# Patient Record
Sex: MALE | Race: WHITE | NOT HISPANIC OR LATINO | Employment: UNEMPLOYED | URBAN - METROPOLITAN AREA
[De-identification: names, ages, dates, MRNs, and addresses within clinical notes are randomized per-mention and may not be internally consistent; named-entity substitution may affect disease eponyms.]

---

## 2020-09-14 ENCOUNTER — OFFICE VISIT (OUTPATIENT)
Dept: PAIN MEDICINE | Facility: CLINIC | Age: 55
End: 2020-09-14
Attending: ANESTHESIOLOGY
Payer: OTHER GOVERNMENT

## 2020-09-14 ENCOUNTER — HOSPITAL ENCOUNTER (OUTPATIENT)
Dept: RADIOLOGY | Facility: OTHER | Age: 55
Discharge: HOME | End: 2020-09-14
Attending: ANESTHESIOLOGY
Payer: OTHER GOVERNMENT

## 2020-09-14 ENCOUNTER — OFFICE VISIT (OUTPATIENT)
Dept: NEUROSURGERY | Facility: CLINIC | Age: 55
End: 2020-09-14
Payer: COMMERCIAL

## 2020-09-14 VITALS
WEIGHT: 254.19 LBS | DIASTOLIC BLOOD PRESSURE: 90 MMHG | HEART RATE: 108 BPM | HEIGHT: 72 IN | SYSTOLIC BLOOD PRESSURE: 136 MMHG | BODY MASS INDEX: 34.43 KG/M2 | TEMPERATURE: 97 F

## 2020-09-14 VITALS
HEART RATE: 111 BPM | SYSTOLIC BLOOD PRESSURE: 130 MMHG | WEIGHT: 253 LBS | TEMPERATURE: 96 F | DIASTOLIC BLOOD PRESSURE: 89 MMHG

## 2020-09-14 DIAGNOSIS — M54.16 LUMBAR RADICULOPATHY: ICD-10-CM

## 2020-09-14 DIAGNOSIS — M96.1 POSTLAMINECTOMY SYNDROME OF LUMBAR REGION: Primary | ICD-10-CM

## 2020-09-14 DIAGNOSIS — G89.4 CHRONIC PAIN SYNDROME: Primary | ICD-10-CM

## 2020-09-14 DIAGNOSIS — G89.4 CHRONIC PAIN SYNDROME: ICD-10-CM

## 2020-09-14 DIAGNOSIS — M96.1 POSTLAMINECTOMY SYNDROME OF LUMBAR REGION: ICD-10-CM

## 2020-09-14 PROCEDURE — 99999 PR PBB SHADOW E&M-EST. PATIENT-LVL III: ICD-10-PCS | Mod: PBBFAC,,, | Performed by: ANESTHESIOLOGY

## 2020-09-14 PROCEDURE — 72110 XR LUMBAR SPINE 5 VIEW WITH FLEX AND EXT: ICD-10-PCS | Mod: 26,,, | Performed by: RADIOLOGY

## 2020-09-14 PROCEDURE — 99204 PR OFFICE/OUTPT VISIT, NEW, LEVL IV, 45-59 MIN: ICD-10-PCS | Mod: S$PBB,,, | Performed by: NEUROLOGICAL SURGERY

## 2020-09-14 PROCEDURE — 72110 X-RAY EXAM L-2 SPINE 4/>VWS: CPT | Mod: 26,,, | Performed by: RADIOLOGY

## 2020-09-14 PROCEDURE — 99999 PR PBB SHADOW E&M-NEW PATIENT-LVL III: CPT | Mod: PBBFAC,,, | Performed by: NEUROLOGICAL SURGERY

## 2020-09-14 PROCEDURE — 99204 OFFICE O/P NEW MOD 45 MIN: CPT | Mod: S$PBB,,, | Performed by: NEUROLOGICAL SURGERY

## 2020-09-14 PROCEDURE — 99999 PR PBB SHADOW E&M-EST. PATIENT-LVL III: CPT | Mod: PBBFAC,,, | Performed by: ANESTHESIOLOGY

## 2020-09-14 PROCEDURE — 99203 OFFICE O/P NEW LOW 30 MIN: CPT | Mod: PBBFAC,25,27 | Performed by: NEUROLOGICAL SURGERY

## 2020-09-14 PROCEDURE — 72110 X-RAY EXAM L-2 SPINE 4/>VWS: CPT | Mod: TC,FY

## 2020-09-14 PROCEDURE — 99999 PR PBB SHADOW E&M-NEW PATIENT-LVL III: ICD-10-PCS | Mod: PBBFAC,,, | Performed by: NEUROLOGICAL SURGERY

## 2020-09-14 PROCEDURE — 99204 OFFICE O/P NEW MOD 45 MIN: CPT | Mod: S$PBB,,, | Performed by: ANESTHESIOLOGY

## 2020-09-14 PROCEDURE — 99213 OFFICE O/P EST LOW 20 MIN: CPT | Mod: PBBFAC | Performed by: ANESTHESIOLOGY

## 2020-09-14 PROCEDURE — 99204 PR OFFICE/OUTPT VISIT, NEW, LEVL IV, 45-59 MIN: ICD-10-PCS | Mod: S$PBB,,, | Performed by: ANESTHESIOLOGY

## 2020-09-14 RX ORDER — SUCRALFATE 1 G/1
1 TABLET ORAL
COMMUNITY

## 2020-09-14 RX ORDER — NORTRIPTYLINE HYDROCHLORIDE 10 MG/1
10 CAPSULE ORAL NIGHTLY
COMMUNITY

## 2020-09-14 RX ORDER — DIFLUNISAL 500 MG/1
500 TABLET, FILM COATED ORAL
COMMUNITY

## 2020-09-14 RX ORDER — ZIPRASIDONE HYDROCHLORIDE 20 MG/1
40 CAPSULE ORAL
COMMUNITY

## 2020-09-14 RX ORDER — ATORVASTATIN CALCIUM 80 MG/1
80 TABLET, FILM COATED ORAL DAILY
COMMUNITY

## 2020-09-14 RX ORDER — OMEPRAZOLE 20 MG/1
20 CAPSULE, DELAYED RELEASE ORAL
COMMUNITY

## 2020-09-14 RX ORDER — DEXAMETHASONE 0.5 MG/5ML
SOLUTION ORAL
COMMUNITY

## 2020-09-14 RX ORDER — PANTOPRAZOLE SODIUM 40 MG/1
40 TABLET, DELAYED RELEASE ORAL
COMMUNITY

## 2020-09-14 RX ORDER — SIMVASTATIN 80 MG/1
80 TABLET, FILM COATED ORAL
COMMUNITY

## 2020-09-14 RX ORDER — CHLORHEXIDINE GLUCONATE ORAL RINSE 1.2 MG/ML
15 SOLUTION DENTAL
COMMUNITY

## 2020-09-14 RX ORDER — OXYCODONE AND ACETAMINOPHEN 5; 325 MG/1; MG/1
1 TABLET ORAL EVERY 4 HOURS PRN
COMMUNITY

## 2020-09-14 RX ORDER — GLIPIZIDE 5 MG/1
5 TABLET, FILM COATED, EXTENDED RELEASE ORAL 2 TIMES DAILY
COMMUNITY

## 2020-09-14 RX ORDER — AMOXICILLIN 500 MG
CAPSULE ORAL DAILY
COMMUNITY

## 2020-09-14 RX ORDER — POTASSIUM CHLORIDE 20 MEQ/1
20 TABLET, EXTENDED RELEASE ORAL
COMMUNITY

## 2020-09-14 RX ORDER — OXYCODONE HYDROCHLORIDE 15 MG/1
15 TABLET ORAL
COMMUNITY

## 2020-09-14 RX ORDER — GABAPENTIN 100 MG/1
100 CAPSULE ORAL
COMMUNITY

## 2020-09-14 RX ORDER — METFORMIN HYDROCHLORIDE 1000 MG/1
1000 TABLET ORAL
COMMUNITY

## 2020-09-14 RX ORDER — CITALOPRAM 40 MG/1
40 TABLET, FILM COATED ORAL
COMMUNITY

## 2020-09-14 RX ORDER — HYDROCHLOROTHIAZIDE 25 MG/1
25 TABLET ORAL
COMMUNITY

## 2020-09-14 RX ORDER — GABAPENTIN 300 MG/1
300 CAPSULE ORAL
COMMUNITY

## 2020-09-14 RX ORDER — LISINOPRIL 20 MG/1
10 TABLET ORAL 2 TIMES DAILY
COMMUNITY

## 2020-09-14 RX ORDER — SIMVASTATIN 10 MG/1
10 TABLET, FILM COATED ORAL
COMMUNITY

## 2020-09-14 RX ORDER — FLUNISOLIDE 0.25 MG/ML
2 SOLUTION NASAL
COMMUNITY

## 2020-09-14 RX ORDER — TERBINAFINE HYDROCHLORIDE 250 MG/1
250 TABLET ORAL
COMMUNITY

## 2020-09-14 NOTE — PROGRESS NOTES
Chronic Pain - New Consult    Referring Physician: Gracie Grimaldo MD    Chief Complaint:   Chief Complaint   Patient presents with    Foot Pain        SUBJECTIVE: Disclaimer: This note has been generated using voice-recognition software. There may be typographical errors that have been missed during proof-reading    Initial encounter:    Jovany Abad presents to the clinic for the evaluation of left foot pain. The pain started 10 years ago insidiously and symptoms have been unchanged.    Brief history:  Patient has been treated with multiple epidural injections in the past along with L4-5 hemilaminectomy which was unhelpful in repeat alleviating his pain.  Also within the last 3 weeks he received an injection in the foot by an outside pain physician and states that it is been unhelpful.    Pain Description:    The pain is located in the left area on the underside between the 1st and 2nd tow.      At BEST  10/10     At WORST  10/10 on the WORST day.      On average pain is rated as 10/10.     Today the pain is rated as 10/10    The pain is described as burning, numbing, pulsating, sharp, shooting, stabbing, throbbing, tight band and tingling      Symptoms interfere with daily activity and sleeping.     Exacerbating factors: always hurting regardless of position or activity.      Mitigating factors medications.     Patient denies urinary incontinence and bowel incontinence.  Patient denies any suicidal or homicidal ideations    Pain Medications:  Current:  Hydrocodone/acetaminophen 10/325 -[facility]  Gabapentin - dose and frequency unknown  Lyrica - 150mg BID     Tried in Past:  NSAIDs -Never  TCA -Never  SNRI -Never  Anti-convulsants -Never  Muscle Relaxants -Never  Opioids-Never  Benzodiazepines -Never    Physical Therapy/Home Exercise: no       report:  Reviewed and consistent with medication use as prescribed.    Pain Procedures:  previous epidural injections without relief  Recent acosta neuroma injection ? Foot injection - records are not available for review    Chiropractor -never  Acupuncture - never  TENS unit -never  Spinal decompression -L4/5 bobby-laminectomy - no improvement in pain  Joint replacement -never    Imaging: none available for review today    No past medical history on file.  No past surgical history on file.  Social History     Socioeconomic History    Marital status: Single     Spouse name: Not on file    Number of children: Not on file    Years of education: Not on file    Highest education level: Not on file   Occupational History    Not on file   Social Needs    Financial resource strain: Not on file    Food insecurity     Worry: Not on file     Inability: Not on file    Transportation needs     Medical: Not on file     Non-medical: Not on file   Tobacco Use    Smoking status: Never Smoker    Smokeless tobacco: Never Used   Substance and Sexual Activity    Alcohol use: Not on file    Drug use: Not on file    Sexual activity: Not on file   Lifestyle    Physical activity     Days per week: Not on file     Minutes per session: Not on file    Stress: Not on file   Relationships    Social connections     Talks on phone: Not on file     Gets together: Not on file     Attends Che[employer] service: Not on file     Active member of club or organization: Not on file     Attends meetings of clubs or organizations: Not on file     Relationship status: Not on file   Other Topics Concern    Not on file   Social History Narrative    Not on file     No family history on file.    Review of patient's allergies indicates:  No Known Allergies    Current Outpatient Medications   Medication Sig    atorvastatin (LIPITOR) 80 MG tablet Take 80 mg by mouth once daily.    gabapentin (NEURONTIN) 100 MG capsule Take 100 mg by mouth.    gabapentin (NEURONTIN) 300 MG capsule Take 300 mg by mouth.    glipiZIDE (GLUCOTROL) 5 MG  TR24 Take 5 mg by mouth 2 (two) times a day.     hydroCHLOROthiazide (HYDRODIURIL) 25 MG tablet Take 25 mg by mouth.    lisinopriL (PRINIVIL,ZESTRIL) 20 MG tablet Take 10 mg by mouth 2 (two) times daily.     metFORMIN (GLUCOPHAGE) 1000 MG tablet Take 1,000 mg by mouth.    nortriptyline (PAMELOR) 10 MG capsule Take 10 mg by mouth every evening.    omega-3 fatty acids/fish oil (FISH OIL-OMEGA-3 FATTY ACIDS) 300-1,000 mg capsule Take by mouth once daily.    potassium chloride SA (K-DUR,KLOR-CON) 20 MEQ tablet Take 20 mEq by mouth.    simvastatin (ZOCOR) 10 MG tablet Take 10 mg by mouth.    simvastatin (ZOCOR) 80 MG tablet Take 80 mg by mouth.    terbinafine HCL (LAMISIL) 250 mg tablet Take 250 mg by mouth.    ziprasidone (GEODON) 20 MG Cap Take 40 mg by mouth.    chlorhexidine (PERIDEX) 0.12 % solution 15 mLs.    citalopram (CELEXA) 40 MG tablet Take 40 mg by mouth.    dexAMETHasone 0.5 mg/5 mL Soln Take by mouth.    diflunisaL (DOLOBID) 500 mg Tab Take 500 mg by mouth.    flunisolide 25 mcg, 0.025%, (NASALIDE) 25 mcg (0.025 %) Spry 2 sprays by Nasal route.    ibuprofen (ADVIL,MOTRIN) 100 MG tablet Take 100 mg by mouth.    omeprazole (PRILOSEC) 20 MG capsule Take 20 mg by mouth.    oxyCODONE (ROXICODONE) 15 MG Tab Take 15 mg by mouth.    oxyCODONE-acetaminophen (PERCOCET) 5-325 mg per tablet Take 1 tablet by mouth every 4 (four) hours as needed.    pantoprazole (PROTONIX) 40 MG tablet Take 40 mg by mouth.    ranitidine (ZANTAC) 150 MG tablet Take 150 mg by mouth.    ranitidine (ZANTAC) 300 MG capsule Take 150 mg by mouth.    sucralfate (CARAFATE) 1 gram tablet Take 1 g by mouth.    vitamin E 100 UNIT capsule Take 100 Units by mouth.     No current facility-administered medications for this visit.        REVIEW OF SYSTEMS:    GENERAL:  No weight loss, malaise or fevers.  HEENT:   No recent changes in vision or hearing  NECK:  Negative for lumps, no difficulty with swallowing.  RESPIRATORY:   Negative for cough, wheezing or shortness of breath, patient denies any recent URI.  CARDIOVASCULAR:  Negative for chest pain, leg swelling or palpitations.  GI:  Negative for abdominal discomfort, blood in stools or black stools or change in bowel habits.  MUSCULOSKELETAL:  See HPI.  SKIN:  Negative for lesions, rash, and itching.  PSYCH:  Depression associated with chronic pain.  Patients sleep isdisturbed secondary to pain.  HEMATOLOGY/LYMPHOLOGY:  Negative for prolonged bleeding, bruising easily or swollen nodes.  Patient is not currently taking any anti-coagulants  ENDO: No history of diabetes or thyroid dysfunction  NEURO:   No history of headaches, syncope, paralysis, seizures or tremors.  All other reviewed and negative other than HPI.    OBJECTIVE:    BP (!) 136/90   Pulse 108   Temp 97.2 °F (36.2 °C) (Oral)   Ht 6' (1.829 m)   Wt 115.3 kg (254 lb 3.1 oz)   BMI 34.47 kg/m²     PHYSICAL EXAMINATION:    GENERAL: Well appearing, in no acute distress, alert and oriented x3.  PSYCH:  Mood and affect appropriate.  SKIN: Skin color, texture, turgor normal, no rashes or lesions.  HEAD/FACE:  Normocephalic, atraumatic. Cranial nerves grossly intact.  CV: RRR with palpation of the radial artery.  PULM: No evidence of respiratory difficulty, symmetric chest rise.  BACK:  Normal range of motion in the lumbar spine, old well healed scar from previous surgery   EXTREMITIES: Peripheral joint ROM is full and pain free without obvious instability or laxity in all four extremities. No deformities, edema, or skin discoloration. Good capillary refill.  MUSCULOSKELETAL: pain to palpation over discreet location between the first and second toe on the left foot no evidence of infection.  There is  Mild pain with palpation over the sacroiliac joints bilaterally.  There is no pain to palpation over the greater trochanteric bursa bilaterally.   FADIRs test is negative.   Bilateral lower extremity strength is normal and  symmetric.  No atrophy or tone abnormalities are noted.  NEURO: Bilateral lower extremity coordination and muscle stretch reflexes are physiologic and symmetric.  Plantar response are downgoing. No clonus.  No loss of sensation is noted.  GAIT Antalgic, ambulates without assistance          ASSESSMENT: 54 y.o. year old male with pain, consistent with     Encounter Diagnoses   Name Primary?    Postlaminectomy syndrome of lumbar region Yes    Chronic pain syndrome     Lumbar radiculopathy        PLAN:   CC, CMP, x-rays with flexion-extension to rule out instability evaluate hardware    The patient will bring his MRI disc and report from previous MRI if this is greater than 6-month-old we may need to update this prior to surgery.  Additionally the patient will bring his EMG/NCV report.    We discussed at length trialing anticonvulsant medications including Lyrica and gabapentin, the patient was unclear of how he is taking this medication whether not he is taking the medication with regularity for the dosing of the medication.  We will get a release of information to get records from his primary care physician to review this prior to making any considerable changes in his medications as he is using.    The patient insists that opioid medications have been helping he is currently under opioid contract with his primary care physician and has been weaned from opioids we do not have records of this but I explained to the patient I can not resume opioid medications for him but we will consider spinal cord stimulation as a treatment option    We filled out a consent form today for dorsal column stimulation with [facility] given the focal nature of his pain on the left foot he would benefit from an L5-S1 and S1 DRG lead trial    Patient will need psychological evaluation prior, we will have this arranged.      The above plan and management options were discussed at length with patient. Patient is in agreement with the above  and verbalized understanding. It will be communicated with the referring physician via electronic record, fax, or mail.    Saundra Cantrell  09/14/2020

## 2020-09-14 NOTE — LETTER
September 15, 2020      [facility]  [address]  [place] [address]           [facility] 7th Fl  [address]  [facility] [address]  Phone: [phone]  Fax: [phone]          Patient: Jovany Abad   MR Number: [id number]   YOB: 1965   Date of Visit: 9/14/2020       Dear [facility]:    Thank you for referring Jovany Abad to me for evaluation. Attached you will find relevant portions of my assessment and plan of care.    If you have questions, please do not hesitate to call me. I look forward to following Jovany Abad along with you.    Sincerely,    Gracie Grimaldo MD    Enclosure  CC:  No Recipients    If you would like to receive this communication electronically, please contact [email or web address] or [phone] to request more information on [facility] [facility] access.    For providers and/or their staff who would like to refer a patient to [facility], please contact us through our one-stop-shop provider referral line, [facility], at [phone].    If you feel you have received this communication in error or would no longer like to receive these types of communications, please e-mail [email or web address]

## 2020-09-14 NOTE — PROGRESS NOTES
Neurosurgery  History & Physical    SUBJECTIVE:     Chief Complaint:  Left foot pain    History of Present Illness:  Mr. Abad is a 54-year-old male who was referred to me by Dr. Norton.  His past medical history is significant for hypertension, hyperlipidemia, diabetes, and depression.  He complains of left plantar foot pain that has been present for approximately 10 years.  The pain came on suddenly without any inciting factor.  He denies any accident that led to the start of the pain.  The pain is in the bottom of his left foot with numbness and tingling in the left foot and all of the left toes.  He denies any left leg pain or ankle pain.  He denies any left leg or ankle numbness.  He denies any pain in his back.  He denies any pain or paresthesias in his right foot or leg.  The pain is present constantly.  There is nothing that he can do that makes the pain any better or worse.  He has tried physical therapy without any benefit.  He has had epidural steroid injections without any improvement in his pain.  He has had a lumbar microdiskectomy which did not improve his pain.  Pain medication does not help his pain.  He complains of difficulty walking secondary to the pain.  He denies weakness.  He denies any bowel or bladder incontinence.  He was referred to me for consideration for dorsal root ganglion stimulator.    Review of patient's allergies indicates:  No Known Allergies    Current Outpatient Medications   Medication Sig Dispense Refill    atorvastatin (LIPITOR) 80 MG tablet Take 80 mg by mouth once daily.      gabapentin (NEURONTIN) 100 MG capsule Take 100 mg by mouth.      gabapentin (NEURONTIN) 300 MG capsule Take 300 mg by mouth.      glipiZIDE (GLUCOTROL) 5 MG TR24 Take 5 mg by mouth 2 (two) times a day.       lisinopriL (PRINIVIL,ZESTRIL) 20 MG tablet Take 10 mg by mouth 2 (two) times daily.       metFORMIN (GLUCOPHAGE) 1000 MG tablet Take 1,000 mg by mouth.      nortriptyline (PAMELOR) 10  MG capsule Take 10 mg by mouth every evening.      omega-3 fatty acids/fish oil (FISH OIL-OMEGA-3 FATTY ACIDS) 300-1,000 mg capsule Take by mouth once daily.      pantoprazole (PROTONIX) 40 MG tablet Take 40 mg by mouth.      potassium chloride SA (K-DUR,KLOR-CON) 20 MEQ tablet Take 20 mEq by mouth.      chlorhexidine (PERIDEX) 0.12 % solution 15 mLs.      citalopram (CELEXA) 40 MG tablet Take 40 mg by mouth.      dexAMETHasone 0.5 mg/5 mL Soln Take by mouth.      diflunisaL (DOLOBID) 500 mg Tab Take 500 mg by mouth.      flunisolide 25 mcg, 0.025%, (NASALIDE) 25 mcg (0.025 %) Spry 2 sprays by Nasal route.      hydroCHLOROthiazide (HYDRODIURIL) 25 MG tablet Take 25 mg by mouth.      ibuprofen (ADVIL,MOTRIN) 100 MG tablet Take 100 mg by mouth.      omeprazole (PRILOSEC) 20 MG capsule Take 20 mg by mouth.      oxyCODONE (ROXICODONE) 15 MG Tab Take 15 mg by mouth.      oxyCODONE-acetaminophen (PERCOCET) 5-325 mg per tablet Take 1 tablet by mouth every 4 (four) hours as needed.      ranitidine (ZANTAC) 150 MG tablet Take 150 mg by mouth.      ranitidine (ZANTAC) 300 MG capsule Take 150 mg by mouth.      simvastatin (ZOCOR) 10 MG tablet Take 10 mg by mouth.      simvastatin (ZOCOR) 80 MG tablet Take 80 mg by mouth.      sucralfate (CARAFATE) 1 gram tablet Take 1 g by mouth.      terbinafine HCL (LAMISIL) 250 mg tablet Take 250 mg by mouth.      vitamin E 100 UNIT capsule Take 100 Units by mouth.      ziprasidone (GEODON) 20 MG Cap Take 40 mg by mouth.       No current facility-administered medications for this visit.        History reviewed. No pertinent past medical history.  History reviewed. No pertinent surgical history.  Family History     None        Social History     Socioeconomic History    Marital status: Single     Spouse name: Not on file    Number of children: Not on file    Years of education: Not on file    Highest education level: Not on file   Occupational History    Not on file    Social Needs    Financial resource strain: Not on file    Food insecurity     Worry: Not on file     Inability: Not on file    Transportation needs     Medical: Not on file     Non-medical: Not on file   Tobacco Use    Smoking status: Never Smoker    Smokeless tobacco: Never Used   Substance and Sexual Activity    Alcohol use: Not on file    Drug use: Not on file    Sexual activity: Not on file   Lifestyle    Physical activity     Days per week: Not on file     Minutes per session: Not on file    Stress: Not on file   Relationships    Social connections     Talks on phone: Not on file     Gets together: Not on file     Attends [employer] service: Not on file     Active member of club or organization: Not on file     Attends meetings of clubs or organizations: Not on file     Relationship status: Not on file   Other Topics Concern    Not on file   Social History Narrative    Not on file       Review of Systems   Constitutional: Negative for activity change, diaphoresis, fatigue, fever and unexpected weight change.   HENT: Negative for hearing loss, nosebleeds, tinnitus and trouble swallowing.    Eyes: Negative for visual disturbance.   Respiratory: Negative for cough, shortness of breath and wheezing.    Cardiovascular: Negative for chest pain and palpitations.   Gastrointestinal: Negative for abdominal distention, abdominal pain, blood in stool, constipation, diarrhea, nausea and vomiting.   Endocrine: Positive for polydipsia. Negative for cold intolerance and heat intolerance.   Genitourinary: Negative for difficulty urinating, enuresis, frequency and urgency.   Musculoskeletal: Negative for back pain, gait problem, joint swelling, myalgias, neck pain and neck stiffness.   Skin: Negative for color change, rash and wound.   Allergic/Immunologic: Negative for environmental allergies and food allergies.   Neurological: Negative for dizziness, seizures, facial asymmetry, speech difficulty, weakness,  light-headedness, numbness and headaches.   Hematological: Does not bruise/bleed easily.   Psychiatric/Behavioral: Negative for agitation, behavioral problems, dysphoric mood and hallucinations. The patient is not nervous/anxious.        OBJECTIVE:     Vital Signs  Temp: 96.1 °F (35.6 °C)  Pulse: (!) 111  BP: 130/89  Pain Score:   9  Weight: 114.8 kg (253 lb)  There is no height or weight on file to calculate BMI.      Physical Exam:  Vitals reviewed.    Constitutional: He appears well-developed and well-nourished. No distress.     Eyes: Pupils are equal, round, and reactive to light. Conjunctivae and EOM are normal.     Cardiovascular: Normal rate, regular rhythm, normal pulses and no edema.     Abdominal: Soft. Bowel sounds are normal.     Skin: Skin displays no rash on trunk and no rash on extremities. Skin displays no lesions on trunk and no lesions on extremities.     Psych/Behavior: He is alert. He is oriented to person, place, and time. He has a normal mood and affect.     Musculoskeletal: Gait is normal.        Neck: Range of motion is full. There is no tenderness. Muscle strength is 5/5. Tone is normal.        Back: Range of motion is full. There is no tenderness. Muscle strength is 5/5. Tone is normal.        Right Upper Extremities: Range of motion is full. There is no tenderness. Muscle strength is 5/5. Tone is normal.        Left Upper Extremities: Range of motion is full. There is no tenderness. Muscle strength is 5/5. Tone is normal.       Right Lower Extremities: Range of motion is full. There is no tenderness. Muscle strength is 5/5. Tone is normal.        Left Lower Extremities: Range of motion is full. There is no tenderness. Muscle strength is 5/5. Tone is normal.     Neurological:        Coordination: He has a normal Romberg Test, normal finger to nose coordination and normal tandem walking coordination.        Sensory: There is no sensory deficit in the trunk. There is no sensory deficit in the  extremities.        DTRs: DTRs are DTRS NORMAL AND SYMMETRICnormal and symmetric. He displays no Babinski's sign on the right side. He displays no Babinski's sign on the left side.        Cranial nerves: Cranial nerve(s) II, III, IV, V, VI, VII, VIII, IX, X, XI and XII are intact.         Diagnostic Results:  He has an MRI of the left foot available for review which I personally reviewed.  This shows mild degenerative changes.  There is no stress fracture or stress reaction.  There is no tendon tear are tenosynovitis.  There is no evidence of fibromatosis or soft tissue mass.    ASSESSMENT/PLAN:     Mr. Abad is a 54-year-old male with a 10 year history of left plantar foot pain as described above.  He has tried and failed multiple conservative therapies.  His pain is not consistent with radiculopathy.  Since the pain is localized to the left foot, I do believe that he would be a good candidate for dorsal root ganglion stimulator trial.  I explained to the patient that I do not perform these.  I have put in a referral to my pain colleague, Dr. Saundra Cantrell.  He is willing to see the patient today.  At this point, I will see the patient on an as-needed basis.  He knows he can call with any further questions or concerns.        Note dictated with voice recognition software, please excuse any grammatical errors.

## 2020-09-14 NOTE — LETTER
September 14, 2020      Gracie Grimaldo MD  [address]  [place] [address]           [facility]  [address]  [place] [address]  Phone: [phone]  Fax: [phone]          Patient: Jovany Abad   MR Number: [id number]   YOB: 1965   Date of Visit: 9/14/2020       Dear Dr. Gracie Grimaldo:    Thank you for referring Jovany Abad to me for evaluation. Attached you will find relevant portions of my assessment and plan of care.    If you have questions, please do not hesitate to call me. I look forward to following Jovany Abad along with you.    Sincerely,    Saundra Cantrell MD    Enclosure  CC:  No Recipients    If you would like to receive this communication electronically, please contact [email or web address] or [phone] to request more information on [facility] access.    For providers and/or their staff who would like to refer a patient to [facility], please contact us through our one-stop-shop provider referral line, [facility], at [phone].    If you feel you have received this communication in error or would no longer like to receive these types of communications, please e-mail [email or web address]

## 2020-10-02 ENCOUNTER — OFFICE VISIT (OUTPATIENT)
Dept: PAIN MEDICINE | Facility: CLINIC | Age: 55
End: 2020-10-02
Payer: OTHER GOVERNMENT

## 2020-10-02 ENCOUNTER — OFFICE VISIT (OUTPATIENT)
Dept: PSYCHIATRY | Facility: CLINIC | Age: 55
End: 2020-10-02
Payer: OTHER GOVERNMENT

## 2020-10-02 ENCOUNTER — APPOINTMENT (OUTPATIENT)
Dept: PAIN MEDICINE | Facility: CLINIC | Age: 55
End: 2020-10-02
Payer: OTHER GOVERNMENT

## 2020-10-02 VITALS
RESPIRATION RATE: 18 BRPM | HEIGHT: 72 IN | SYSTOLIC BLOOD PRESSURE: 152 MMHG | OXYGEN SATURATION: 100 % | WEIGHT: 253.5 LBS | DIASTOLIC BLOOD PRESSURE: 91 MMHG | BODY MASS INDEX: 34.34 KG/M2 | TEMPERATURE: 97 F | HEART RATE: 117 BPM

## 2020-10-02 DIAGNOSIS — G89.4 CHRONIC PAIN SYNDROME: ICD-10-CM

## 2020-10-02 DIAGNOSIS — M96.1 POSTLAMINECTOMY SYNDROME OF LUMBAR REGION: ICD-10-CM

## 2020-10-02 DIAGNOSIS — M54.16 LUMBAR RADICULOPATHY: ICD-10-CM

## 2020-10-02 DIAGNOSIS — M96.1 POSTLAMINECTOMY SYNDROME OF LUMBAR REGION: Primary | ICD-10-CM

## 2020-10-02 DIAGNOSIS — Z01.818 PREOP EXAMINATION: ICD-10-CM

## 2020-10-02 DIAGNOSIS — F33.1 MAJOR DEPRESSIVE DISORDER, RECURRENT, MODERATE: ICD-10-CM

## 2020-10-02 DIAGNOSIS — Z01.818 PREOPERATIVE EVALUATION TO RULE OUT SURGICAL CONTRAINDICATION: Primary | ICD-10-CM

## 2020-10-02 DIAGNOSIS — F41.1 GENERALIZED ANXIETY DISORDER: ICD-10-CM

## 2020-10-02 PROCEDURE — 96130 PSYCL TST EVAL PHYS/QHP 1ST: CPT | Mod: 95,,, | Performed by: PSYCHOLOGIST

## 2020-10-02 PROCEDURE — 99999 PR PBB SHADOW E&M-EST. PATIENT-LVL III: ICD-10-PCS | Mod: PBBFAC,,, | Performed by: NURSE PRACTITIONER

## 2020-10-02 PROCEDURE — 96138 PR PSYCH/NEUROPSYCH TEST ADMIN/SCORING, BY TECH, 2+ TESTS, 1ST 30 MIN: ICD-10-PCS | Mod: 95,,, | Performed by: PSYCHOLOGIST

## 2020-10-02 PROCEDURE — 96131 PSYCL TST EVAL PHYS/QHP EA: CPT | Mod: 95,,, | Performed by: PSYCHOLOGIST

## 2020-10-02 PROCEDURE — 99213 OFFICE O/P EST LOW 20 MIN: CPT | Mod: S$PBB,,, | Performed by: NURSE PRACTITIONER

## 2020-10-02 PROCEDURE — 99213 PR OFFICE/OUTPT VISIT, EST, LEVL III, 20-29 MIN: ICD-10-PCS | Mod: S$PBB,,, | Performed by: NURSE PRACTITIONER

## 2020-10-02 PROCEDURE — 96131 PR PSYCHOLOGIC TEST EVAL SVCS, EA ADDTL HR: ICD-10-PCS | Mod: 95,,, | Performed by: PSYCHOLOGIST

## 2020-10-02 PROCEDURE — 96139 PR PSYCH/NEUROPSYCH TEST ADMIN/SCORING, BY TECH, 2+ TESTS, EA ADDTL 30 MIN: ICD-10-PCS | Mod: 95,,, | Performed by: PSYCHOLOGIST

## 2020-10-02 PROCEDURE — 90791 PSYCH DIAGNOSTIC EVALUATION: CPT | Mod: GT,,, | Performed by: PSYCHOLOGIST

## 2020-10-02 PROCEDURE — 99213 OFFICE O/P EST LOW 20 MIN: CPT | Mod: PBBFAC | Performed by: NURSE PRACTITIONER

## 2020-10-02 PROCEDURE — 96130 PR PSYCHOLOGIC TEST EVAL SVCS, 1ST HR: ICD-10-PCS | Mod: 95,,, | Performed by: PSYCHOLOGIST

## 2020-10-02 PROCEDURE — 96138 PSYCL/NRPSYC TECH 1ST: CPT | Mod: 95,,, | Performed by: PSYCHOLOGIST

## 2020-10-02 PROCEDURE — 99999 PR PBB SHADOW E&M-EST. PATIENT-LVL III: CPT | Mod: PBBFAC,,, | Performed by: NURSE PRACTITIONER

## 2020-10-02 PROCEDURE — 96139 PSYCL/NRPSYC TST TECH EA: CPT | Mod: 95,,, | Performed by: PSYCHOLOGIST

## 2020-10-02 PROCEDURE — 90791 PR PSYCHIATRIC DIAGNOSTIC EVALUATION: ICD-10-PCS | Mod: GT,,, | Performed by: PSYCHOLOGIST

## 2020-10-02 NOTE — PROGRESS NOTES
Chronic Pain - New Consult    Referring Physician: No ref. provider found    Chief Complaint:   No chief complaint on file.       SUBJECTIVE: Disclaimer: This note has been generated using voice-recognition software. There may be typographical errors that have been missed during proof-reading    Interval History 10/2/2020:  Patient presents today focally for follow-up of lab work and x-ray report.  He was here today for psych eval to consider him a spinal cord stimulator candidate.  He had concerns about Lower which apparently is regarding elevated creatinine but he states he wanted further clarification.  X-ray results were reviewed with him as well as laboratory results.    Initial encounter:    Jovany Abad presents to the clinic for the evaluation of left foot pain. The pain started 10 years ago insidiously and symptoms have been unchanged.    Brief history:  Patient has been treated with multiple epidural injections in the past along with L4-5 hemilaminectomy which was unhelpful in repeat alleviating his pain.  Also within the last 3 weeks he received an injection in the foot by an outside pain physician and states that it is been unhelpful.    Pain Description:    The pain is located in the left area on the underside between the 1st and 2nd tow.      At BEST  10/10     At WORST  10/10 on the WORST day.      On average pain is rated as 10/10.     Today the pain is rated as 10/10    The pain is described as burning, numbing, pulsating, sharp, shooting, stabbing, throbbing, tight band and tingling      Symptoms interfere with daily activity and sleeping.     Exacerbating factors: always hurting regardless of position or activity.      Mitigating factors medications.     Patient denies urinary incontinence and bowel incontinence.  Patient denies any suicidal or homicidal ideations    Pain Medications:  Current:  Hydrocodone/acetaminophen 10/325 -[facility] and [facility]  [facility]  Gabapentin - dose and frequency unknown  Lyrica - 150mg BID     Tried in Past:  NSAIDs -Never  TCA -Never  SNRI -Never  Anti-convulsants -Never  Muscle Relaxants -Never  Opioids-Never  Benzodiazepines -Never    Physical Therapy/Home Exercise: no       report:  Reviewed and consistent with medication use as prescribed.    Pain Procedures: previous epidural injections without relief  Recent acosta neuroma injection ? Foot injection - records are not available for review    Chiropractor -never  Acupuncture - never  TENS unit -never  Spinal decompression -L4/5 bobby-laminectomy - no improvement in pain  Joint replacement -never    Imaging: none available for review today    History reviewed. No pertinent past medical history.  History reviewed. No pertinent surgical history.  Social History     Socioeconomic History    Marital status: Single     Spouse name: Not on file    Number of children: Not on file    Years of education: Not on file    Highest education level: Not on file   Occupational History    Not on file   Social Needs    Financial resource strain: Not on file    Food insecurity     Worry: Not on file     Inability: Not on file    Transportation needs     Medical: Not on file     Non-medical: Not on file   Tobacco Use    Smoking status: Never Smoker    Smokeless tobacco: Never Used   Substance and Sexual Activity    Alcohol use: Not on file    Drug use: Not on file    Sexual activity: Not on file   Lifestyle    Physical activity     Days per week: Not on file     Minutes per session: Not on file    Stress: Not on file   Relationships    Social connections     Talks on phone: Not on file     Gets together: Not on file     Attends Judaism service: Not on file     Active member of club or organization: Not on file     Attends meetings of clubs or organizations: Not on file     Relationship status: Not on file   Other Topics Concern    Not on file   Social History  Narrative    Not on file     History reviewed. No pertinent family history.    Review of patient's allergies indicates:  No Known Allergies    Current Outpatient Medications   Medication Sig    atorvastatin (LIPITOR) 80 MG tablet Take 80 mg by mouth once daily.    chlorhexidine (PERIDEX) 0.12 % solution 15 mLs.    citalopram (CELEXA) 40 MG tablet Take 40 mg by mouth.    dexAMETHasone 0.5 mg/5 mL Soln Take by mouth.    diflunisaL (DOLOBID) 500 mg Tab Take 500 mg by mouth.    flunisolide 25 mcg, 0.025%, (NASALIDE) 25 mcg (0.025 %) Spry 2 sprays by Nasal route.    gabapentin (NEURONTIN) 100 MG capsule Take 100 mg by mouth.    gabapentin (NEURONTIN) 300 MG capsule Take 300 mg by mouth.    glipiZIDE (GLUCOTROL) 5 MG TR24 Take 5 mg by mouth 2 (two) times a day.     hydroCHLOROthiazide (HYDRODIURIL) 25 MG tablet Take 25 mg by mouth.    ibuprofen (ADVIL,MOTRIN) 100 MG tablet Take 100 mg by mouth.    lisinopriL (PRINIVIL,ZESTRIL) 20 MG tablet Take 10 mg by mouth 2 (two) times daily.     metFORMIN (GLUCOPHAGE) 1000 MG tablet Take 1,000 mg by mouth.    nortriptyline (PAMELOR) 10 MG capsule Take 10 mg by mouth every evening.    omega-3 fatty acids/fish oil (FISH OIL-OMEGA-3 FATTY ACIDS) 300-1,000 mg capsule Take by mouth once daily.    omeprazole (PRILOSEC) 20 MG capsule Take 20 mg by mouth.    oxyCODONE (ROXICODONE) 15 MG Tab Take 15 mg by mouth.    oxyCODONE-acetaminophen (PERCOCET) 5-325 mg per tablet Take 1 tablet by mouth every 4 (four) hours as needed.    pantoprazole (PROTONIX) 40 MG tablet Take 40 mg by mouth.    potassium chloride SA (K-DUR,KLOR-CON) 20 MEQ tablet Take 20 mEq by mouth.    ranitidine (ZANTAC) 150 MG tablet Take 150 mg by mouth.    ranitidine (ZANTAC) 300 MG capsule Take 150 mg by mouth.    simvastatin (ZOCOR) 10 MG tablet Take 10 mg by mouth.    simvastatin (ZOCOR) 80 MG tablet Take 80 mg by mouth.    sucralfate (CARAFATE) 1 gram tablet Take 1 g by mouth.    terbinafine HCL  (LAMISIL) 250 mg tablet Take 250 mg by mouth.    vitamin E 100 UNIT capsule Take 100 Units by mouth.    ziprasidone (GEODON) 20 MG Cap Take 40 mg by mouth.     No current facility-administered medications for this visit.        REVIEW OF SYSTEMS:    GENERAL:  No weight loss, malaise or fevers.  HEENT:   No recent changes in vision or hearing  NECK:  Negative for lumps, no difficulty with swallowing.  RESPIRATORY:  Negative for cough, wheezing or shortness of breath, patient denies any recent URI.  CARDIOVASCULAR:  Negative for chest pain, leg swelling or palpitations.  GI:  Negative for abdominal discomfort, blood in stools or black stools or change in bowel habits.  MUSCULOSKELETAL:  See HPI.  SKIN:  Negative for lesions, rash, and itching.  PSYCH:  Depression associated with chronic pain.  Patients sleep isdisturbed secondary to pain.  HEMATOLOGY/LYMPHOLOGY:  Negative for prolonged bleeding, bruising easily or swollen nodes.  Patient is not currently taking any anti-coagulants  ENDO: No history of diabetes or thyroid dysfunction  NEURO:   No history of headaches, syncope, paralysis, seizures or tremors.  All other reviewed and negative other than HPI.    OBJECTIVE:    BP (!) 152/91   Pulse (!) 117   Temp 97.4 °F (36.3 °C)   Resp 18   Ht 6' (1.829 m)   Wt 115 kg (253 lb 8.5 oz)   SpO2 100%   BMI 34.38 kg/m²     PHYSICAL EXAMINATION:  Voice normal.  Normal affect without evidence of distress.  GENERAL: Well appearing, in no acute distress, alert and oriented x3.  PSYCH: Anxious.  SKIN: Skin color, texture, turgor normal, no rashes or lesions.  HEAD/FACE:  Normocephalic, atraumatic. Cranial nerves grossly intact.  CV: normal rate   PULM: normal effort       Prior PHYSICAL EXAMINATION:    GENERAL: Well appearing, in no acute distress, alert and oriented x3.  PSYCH:  Mood and affect appropriate.  SKIN: Skin color, texture, turgor normal, no rashes or lesions.  HEAD/FACE:  Normocephalic, atraumatic. Cranial  nerves grossly intact.  CV: RRR with palpation of the radial artery.  PULM: No evidence of respiratory difficulty, symmetric chest rise.  BACK:  Normal range of motion in the lumbar spine, old well healed scar from previous surgery   EXTREMITIES: Peripheral joint ROM is full and pain free without obvious instability or laxity in all four extremities. No deformities, edema, or skin discoloration. Good capillary refill.  MUSCULOSKELETAL: pain to palpation over discreet location between the first and second toe on the left foot no evidence of infection.  There is  Mild pain with palpation over the sacroiliac joints bilaterally.  There is no pain to palpation over the greater trochanteric bursa bilaterally.   FADIRs test is negative.   Bilateral lower extremity strength is normal and symmetric.  No atrophy or tone abnormalities are noted.  NEURO: Bilateral lower extremity coordination and muscle stretch reflexes are physiologic and symmetric.  Plantar response are downgoing. No clonus.  No loss of sensation is noted.  GAIT Antalgic, ambulates without assistance          ASSESSMENT: 54 y.o. year old male with pain, consistent with     Encounter Diagnoses   Name Primary?    Postlaminectomy syndrome of lumbar region Yes    Lumbar radiculopathy        PLAN:     -Discussed CBC, CMP, x-rays with flexion-extension with patient   - Discussed need for PCP to follow labs and C[facility] results [facility]  - No medication changes today, PCP to manage meds  - Will await Psych results and contact patient to proceed with SCS  - Mclaughlin DRG is device of use given the focal nature of his pain on the left foot he would benefit from an L5-S1 and S1 DRG lead trial  - RTC We will contact him to further proceed.     The above plan and management options were discussed at length with patient. Patient is in agreement with the above and verbalized understanding. It will be communicated with the referring physician via electronic  record, fax, or mail.    Dominguez Vergara  10/02/2020

## 2020-10-02 NOTE — PSYCH TESTING
BASIC TELECONSULT NOTE    NAME: Jovany Abad  MRN: [id number]  : 1965    Consultation started: 10/02/2020 at 1:00 PM  The chief complaint leading to consultation is:  Chronic pain  The patient location is: [facility]  The patient arrived at: 11:00 AM (for testing)  Technician at side with patient assisting consultant. Also present with the patient at the time of the consultation: N/A  Consultation ended: 10/02/2020 at 2:35 PM  Total time spent with patient: > 70 Minutes  Consulting clinician was informed of the encounter and consult note.        [facility] [address] [place]  [address]  [phone]    REPORT OF PSYCHOLOGICAL TESTING    NAME: Jovany Abad  MRN: [id number]  : 1965    REFERRED BY:  Saundra Cantrell M.D.    REASON FOR REFERRAL:  Psychological Evaluation prior to surgical implantation of a DRG stimulator to address chronic pain    EVALUATED BY:  Heather De La Rosa, Ph.D., Clinical Psychologist  SANDOVAL Tristan, Technician    DATE OF EVALUATION: 10/002/2020    EVALUATION PROCEDURES AND TIMES:  Conducted by Psychologist (3 hours):  Integration of patient data, interpretation of standardized test results and clinical data, clinical decision-making, treatment planning and report, and interactive feedback to the patient  CPT Codes:  52927 - 1 hour; 86318 - 1 hour  Conducted by Technician (1 hour, 5 minutes):  Psychological test administration and scoring by technician, two or more tests, any method:  [place] Multiphasic Personality Inventory - 2 - Restructured Form (MMPI-2-RF); Pain and Impairment Relationship Scale (PAIRS); Mayes Pain Catastrophizing Scale (PCS)  CPT Codes:  92121 - 30 minutes; 30932 - 30 minutes    EVALUATION FINDINGS:  The diagnostic interview revealed that Mr. Jovany Abad is a 54-year-old male referred for Psychological Evaluation prior to surgical implantation of a DRG stimulator to address chronic pain  in the bottom of his left foot.  His pain has been present for the past 10 years without sufficient relief despite multiple pain management therapies at the [facility].  He was fixated on poor treatment by the [facility].  His activities are greatly limited.  Mr. Abad is now interested in a trial of DRG stimulation.  He understood risks of surgery and indicated no significant concerns.  He has reasonable expectations for DRG and will consider other treatment options in the future if it is ineffective.    Regarding psychiatric history, Mr. Abad was not forthcoming about his symptoms and overtly presented efforts to move forward with the stimulator and avoid hospitalization.  This response style should be considered with caution as the validity of his responses may be compromised.  Mr. Abad reported a history of inpatient hospitalization (eight years ago) and psychotherapy and pharmacotherapy (for unknown length).  He endorsed depression, anxiety, and possible hallucinations and delusions.  [facility], and is currently prescribed nortriptyline by his PCP with some benefit in mood.  He could not say why he was prescribed Geodon, which is often used for Bipolar Disorder and Schizophrenia.  Based on his reports, he will be assigned diagnoses of Major Depressive Disorder, recurrent, moderate and Generalized Anxiety Disorder with rule-out diagnoses of Schizophrenia and Major Depressive Disorder with psychotic features.    The medical record also revealed the following diagnoses:  Postlaminectomy syndrome of lumbar region; Chronic pain syndrome; Lumbar radiculopathy.    TEST DATA:  Although he completed all testing items, his profile on the MMPI-2-RF was rendered invalid and uninterpretable due to over-reporting and inconsistency.      MMPI-2-RF.  The MMPI-2-RF provides an assessment of personality and psychopathology with specific evaluation of psychosocial  risk factors associated with outcomes of spinal cord stimulation.  Mr. Abad produced an invalid and uninterpretable profile due to over-reporting of infrequent responding and inconsistency.  This level of responding is uncommon even in those who have credible issues, and individual items should be verified with supplemental reports.  This profile may be seen in unusual cases, in efforts to exaggerate, or in efforts to present significant problems.  ITEM-LEVEL INFORMATION.  The full profile may not be interpreted, but individual items were reviewed for information.  Mr. Abad endorsed items related to death, anxiety, persecutory delusions, unusual experiences including hallucinations and delusions, and aggression.    PAIRS and PCS.  The PAIRS and PCS reveal beliefs and attitudes related to pain that may impact outcomes of DRG stimulation.  The PAIRS indicated significant complications related to perceptions of impairment with a total score of 84 (a score over 75 is clinically significant).  The PCS indicated significant catastrophizing of pain with a total score of 49, which is in the 100th percentile (a score over 30 is in the 75th percentile and is clinically significant).  His subscale scores indicated significant Rumination (100th percentile), significant Magnification (97th percentile), and significant Helplessness (98th percentile).    FEEDBACK.  Mr. Abad was provided with test results, and offered the opportunity to respond to feedback and clarify results if needed.  Because his MMPI-2-RF was invalid, it presents possibilities that he may be responding inconsistently or in an exaggerated manner for other items.  In response to the item-level information, it was unclear whether he actually experienced hallucinations or delusions.  He was resistant with providing additional clarifying information because he wanted to present himself with the best chance of moving forward with the DRG.       DIAGNOSTIC IMPRESSIONS:    ICD-10-CM ICD-9-CM   1. Preoperative evaluation to rule out surgical contraindication  Z01.818 V72.83   2. Preop examination  Z01.818 V72.84   3. Chronic pain syndrome  G89.4 338.4   4. Lumbar radiculopathy  M54.16 724.4   5. Postlaminectomy syndrome of lumbar region  M96.1 722.83   6. Major depressive disorder, recurrent, moderate  F33.1 296.32   7. Generalized anxiety disorder  F41.1 300.02   R/O Major Depressive Disorder with psychotic features  R/O Schizophrenia  R/O Schizoaffective Disorder    SUMMARY AND RECOMMENDATIONS:  Mr. Abad has a long history of severe pain and is pursuing the DRG stimulator to improve pain and quality of life.  Test results for the MMPI-2-RF are considered invalid and uninterpretable, which calls into question the validity of all other reports in the overall evaluation.  An item-level analysis indicated possible presence of issues with anxiety, aggression, hallucinations, and delusions.  On self-report measures, his responses indicated maladaptive pain coping regarding impairment and catastrophizing.      Mr. Kessler responses to item endorsements in feedback was unsatisfactory for clarifying his test results and improving credibility of his reports.  The invalidation of his MMPI-2-RF profile is consistent with his presentation and response style in the clinical interview, in which he presented in a guarded manner.  He openly admitted his desires to respond in a way to move forward with DRG stimulation and avoid hospitalization.  He was informed numerous times that this process is to help improve his candidacy for stimulation, but he had difficulty trusting it.    Based on this evaluation, there are contraindications to DRG from a psychological perspective at this time.  The invalidity of his test results prompts caution into his overall reports, and his overt guardedness reinforced issues with his reports.  Additionally, Mr. Abad may be  dealing with inadequately treated depression, anxiety, and possible psychotic symptoms and suicidal ideation.  He reports being prescribed nortriptyline by his PCP, but he notes stopping Geodon and Celexa this past summer.  He also reports stopping psychotherapy and pharmacotherapy prior to the COVID-19 outbreak.  It is recommended that Mr. Abad be treated with a less invasive pain management procedure.  It is recommended [facility] to [facility]-evaluation for DRG stimulation.    Due to potential risks indicated in this evaluation, I will reach out to the [facility] to have his Mental Health Treatment Coordinator do a welfare check.        Interpretation and report and coding were completed on 10/08/2020.

## 2020-10-02 NOTE — PROGRESS NOTES
BASIC TELECONSULT NOTE    NAME: Jovany Abad  MRN: [id number]  : 1965    Consultation started: 10/02/2020 at 1:00 PM  The chief complaint leading to consultation is:  Chronic pain  The patient location is: [facility], Outpatient  The patient arrived at: 11:00 AM (for testing)  Technician at side with patient assisting consultant. Also present with the patient at the time of the consultation: N/A  Consultation ended: 10/02/2020 at 2:35 PM  Total time spent with patient: > 70 Minutes  Consulting clinician was informed of the encounter and consult note.      Psychiatry Initial Visit (PhD/LCSW)    Date:   10/02/2020  Site: [facility]   CPT Code: 44394  Provider Site:  [facility]  Clinical status of patient:  Outpatient  Met with:  Patient via telehealth  Referred by:  Saundra Cantrell M.D.    Chief complaint/reason for encounter:  Psychological Evaluation prior to surgical implantation of a DRG stimulator to address chronic pain    History of present illness:  Mr. Jovany Abad is a 54-year-old male referred for Psychological Evaluation prior to surgical implantation of a DRG stimulator to address chronic pain.  His pain has been present for the past 10 years.  He reported he has chronic pain in the bottom of his left foot.  He has tried physical therapy, medications, injections, and surgery without receiving sufficient relief.  He was very unhappy with past treatment at the [facility] and mentioned it multiple times.  His activities are greatly limited.  He notes that his pain is almost debilitating for the past two weeks, it has been chronic, debilitating pain 24/365 days per year the only thing that helps me is pain medication, and my [facility] is slowly weaning me off I know its not long-term.  He noted being upset about people abusing opioids causing a crisis that is interfering with opioid treatment for compliant patients.  Mr. Abad was able to walk to his  appointment today without assistance.    Mr. Abad is now interested in a trial of DRG stimulation.  He has reviewed the educational materials and is familiar with the procedures involved, but would like additional materials.  He was shown a video on YouTube on DRG stimulation.  He understood risks of surgery including bleeding, infection, failure of surgery, misplaced hardware, migration of hardware, need for reoperation, etc. When asked about potential concerns regarding DRG stimulation, he indicated no significant concerns.  His expectations regarding DRG include 85% success rate and 3/5 would recommend it.  If DRG stimulation is not effective for him he noted he will not give up on his life (I plan to continue I heard all my life if you take your own life, you will go to [facility]).  His brother and lady friend will be available to help him during recovery after surgery.    Mr. Abad reports a history of psychiatric treatment including pharmacotherapy and psychotherapy.  He would not say how long he has been in treatment and did not feel comfortable answering some questions.  He was reluctant to report symptoms and worried about whether his responses would a) Deter him from the DRG stimulator or b) Hospitalize him.  He endorsed symptoms of depression and anxiety, and noted taking nortriptyline as prescribed by his PCP for mood with some benefit.  He notes that he has not seen mental health providers ([facility]iat[facility] and [facility] since prior to [facility]-19, and that he has stopped taking two medications prescribed by his psychiatrist (Geodon and Celexa).  He reports that the medication made his mood worse.  Due to his guardedness, it was difficult to obtain a clear picture of his history and symptoms.  He also appeared to have some naivete about psychological matters, in addition to his overt and noted efforts to present in an acceptable manner.      Medical history:  Postlaminectomy syndrome of  lumbar region; Chronic pain syndrome; Lumbar radiculopathy    Pain scales:   Current level of pain:  7/10 (I took a pain pill before coming here)  Worst pain rating:  10/10  Best pain ratin/10 (When I was taking oxycodone, it would knock it from a 10 to a 4.  But now that Im on hydrocodone, it takes it from a 10 to a 7 or 8.)    Psychiatric Symptoms:  Depression:  He has experienced depression to a certain degree or a certain point after my physical pain.  Currently, he experiences some sadness but not significant.  He also experiences lack of interest, lack of motivation, lethargy, and hopelessness.  He rated his depression as 5/10 because I have my hopes up, I have my spirits up.  Based on his reports, his symptoms meet criteria for Major Depressive Disorder, recurrent, moderate.  Connie/Hypomania:  Denied.  He denied periods of elevated mood or abnormally increased energy or goal-directed activity.  Anxiety:  He has experienced anxiety since he had chronic pain.  He worries about not getting better, thinking about his sister abusing substances (I dont know where she is at all), and his life.  He endorsed physiological hyperarousal along with anxiety.  He experiences worry every day.  He rated his anxiety as 10/10 at its worst, and at 6/10 overall.  Based on his reports, his symptoms do meet criteria for Generalized Anxiety Disorder.  Thoughts:  He notes hearing a couple of things.  He can hear his mother, father, and brother calling his name.  He is unsure whether this is comforting or upsetting to him, just the same as going to the cemetery.  He does not hear this very often and typically only hears them when he is on his own.  He recalls his father coming to his voice telling him that his mothers in the hospital.  He never has nightmares about his family.  He was fixated on pain complaints and mistreatment at the [facility].  It is unclear to what extent these thoughts and beliefs may be  exaggerated.  Suicidal thoughts/behaviors:  Denied current SI due to desires to avoid hospitalization.  He endorsed past SI leading to hospitalization eight years ago.  Self-injury:  Denied.  Substance abuse:  Denied abuse or dependence.  Cognitive functioning:  He worries about having a touch of dementia everything that happened to my mother, I think Im getting the same genes.  The same things she has.    Strengths and liabilities:  Strength: Patient is motivated for change., Strength: Patient is stable., Liability: Patient is defensive.    Current and past substance use:  Alcohol: Denied current use; denied history of abuse or dependency.   Drugs: Denied current use; denied history of abuse or dependency.  Tobacco: None.   Caffeine: He drinks one cup of coffee each week.    Current Psychiatric Treatment:  Medications:  He is currently prescribed Celexa and Geodon, but he is not taking those medications.  He last took them July 2020.  He notes, That stuff - it makes me feel worse than I already feel.  Those pills put crazy thoughts in your head.  It makes you worse than what you already are.  He was prescribed medications because what do you think chronic pain will do to you after 10 years?  He [facility] not [facility] the [facility] since [facility], but previously attended treatment for some time (he did not feel comfortable answering this question).  He was informed that it was concerning to me that he has not seen his psychiatrist since January, but he is taking nortriptyline by his PCP (I feel like it may be helping me a little bit mentally and a little bit physically).  Psychotherapy:  Denied.    Psychiatric History:  Previous diagnosis:  He has never been told whether he has any psychiatric diagnoses.  He blamed the [facility] for their inability to tell patients whether they have diagnoses.  He was asked whether he has experienced depression, anxiety, Bipolar Disorder, or Schizophrenia - because  he was prescribed medications that are typically prescribed for those symptoms.  He thought perhaps he could have those disorders and queried whether I could diagnose him after stimulation.  I informed him I would need to diagnose him prior to stimulation in order to see whether recommendations are needed.  Previous hospitalizations:  He was hospitalized 8 years ago due to suicidal ideation, but he had no intention of committing suicide (I had even sold all my pistols and shotguns).  He did not benefit from his treatment at that time.  History of outpatient treatment:  [facility] prior [facility].  He notes that it was helpful just talking to someone.  He would be willing to do a telephone session.    Previous suicide attempt:  Denied.  Family history of psychiatric illness:  None reported.    Trauma history:  None reported.    Social history (marriage, employment, etc.):    Mr. Ahn was born and raised in [place], [place] by his biological mother and father along with two brothers and a sister.  He described his childhood as ok.  He denied childhood trauma, abuse, and neglect.  He got As and Bs in school and earned a high school diploma.  He denied being enrolled in special education or being held back.  He denied significant detentions, suspensions, and expulsions.  He served four years in the  ([employer]  He is on disability (since 2005, but uncomfortable with answering for what reason) and finances are stable.  He is not .  He has been dating his girlfriend for the past four to five years.  They respect each others independence greatly (I dont want to get , she dont want to get  if we did get , it wouldnt last.).  He has no children.  He currently lives with by himself.  His yovanny is important to him, but not a source of support to him.    Legal history:  Denied.    Mental Status Exam:  General appearance:  appears stated age,  neatly dressed, well groomed  Speech:  normal rate and tone  Level of cooperation:  cooperative  Thought processes:  logical, goal-directed  Mood:  anxious/guarded  Thought content:  no illusions, potential hallucinations and delusions, no active or passive homicidal thoughts, potential suicidal ideation although overtly denied, no obsessions, no compulsions, no violence  Affect:  appropriate  Orientation:  oriented to person, place, and date  Memory:  Recent memory:  2 of 3 objects after brief delay.    Remote memory - intact  Attention span and concentration:  spelled HOUSE forward and backwards  Fund of general knowledge: 3 of 3 recent presidents  Abstract reasoning:    Similarities: abstract.    Proverbs: abstract.  Judgment and insight: fair  Language:  intact    Diagnostic impressions:    ICD-10-CM ICD-9-CM   1. Preoperative evaluation to rule out surgical contraindication  Z01.818 V72.83   2. Preop examination  Z01.818 V72.84   3. Chronic pain syndrome  G89.4 338.4   4. Lumbar radiculopathy  M54.16 724.4   5. Postlaminectomy syndrome of lumbar region  M96.1 722.83   6. Major depressive disorder, recurrent, moderate  F33.1 296.32   7. Generalized anxiety disorder  F41.1 300.02   R/O Major Depressive Disorder with psychotic features  R/O Schizophrenia  R/O Schizoaffective Disorder      Plan and Recommendations:  Mr. Abad completed psychological testing.  The testing report of this psychological evaluation will follow in the Notes folder in the patients chart in the encounter titled Psychological Testing.    Based on this interview, there appear to be psychological contraindications to DRG stimulation at this time.  Mr. Abad was not forthcoming about his symptoms, and openly discussed his desire to do what it takes to move forward with the stimulator and avoid hospitalization.  The validity of his responses overall may be in question due to his response style and motives, and his reports should be  approached with some caution overall.      Mr. Abad endorsed symptoms of depression and anxiety that appear to be moderate to severe at this time.  Approximately eight years ago, he was hospitalized for suicidal ideation and severe depression due to chronic pain.  He is very fearful of being hospitalized again, and it is unclear whether he is actually not experiencing suicidal ideation or is reporting in such a manner to avoid hospitalization.  He is currently prescribed psychotropic medication by his PCP for mood with some benefit, but has not attended psychiatric treatment since prior to COVID-19.  He would not provide information as to how long he has attended pharmacotherapy and psychotherapy.      Mr. Abad could not provide information as to why he was prescribed Geodon, which is often used for Bipolar Disorder and Schizophrenia.  He endorsed hearing his  family members voices and having intrusive memories of his father in a casket, but it was unclear whether these events meet criteria for hallucinations and delusions.  It is unclear whether his fixations on [facility] the [facility] and pain complaints rise to the point of delusional.  It was difficult to clarify these symptoms due to his guarded response style.    Therefore, Mr. Abad may not be considered a suitable candidate to proceed with DRG stimulation from a psychological perspective at this time.  It is recommended that less invasive procedures be considered for him prior to DRG stimulation.  Regarding treatment, it is recommended he re-establish consistent psychiatric treatment at the [facility] for at least six months prior to re-evaluation for DRG stimulation.    Due to potential risks indicated in this evaluation, I will reach out to the [facility] to have his Mental Health Treatment Coordinator do a welfare check.        Length of time:  95 minutes

## 2020-10-05 ENCOUNTER — TELEPHONE (OUTPATIENT)
Dept: PAIN MEDICINE | Facility: CLINIC | Age: 55
End: 2020-10-05

## 2020-10-05 NOTE — TELEPHONE ENCOUNTER
----- Message from Christie Suarez sent at 10/5/2020  8:50 AM CDT -----  Who Called: ANTWON WILSON     What is the reqeust in detail: Pt is requesting to have the labs that he had done on 09/14 sent to his PCP for an appointment he has tomorrow. States he goes to the [facility] in [place] and his PCP name is Dr. Suresh Hobbs. Fax: [phone], attn to Dr. Hobbs. Please advise.     Can the clinic reply by [facility]?: No    Best Call Back Number: [phone]

## 2020-10-05 NOTE — TELEPHONE ENCOUNTER
----- Message from Lawrence Gonzalez sent at 10/5/2020 11:06 AM CDT -----  Regarding: Pt Advice  Contact: ANTWON WILSON [[id number]]  Name of Who is Calling: ANTWON WILSON [[id number]]      What is the request in detail: Would like to speak with staff in regards to needing to talk about something's. No other information provided, please advise.      Can the clinic reply by [facility]: no      What [facility]: [phone]

## 2020-10-08 ENCOUNTER — TELEPHONE (OUTPATIENT)
Dept: PSYCHIATRY | Facility: CLINIC | Age: 55
End: 2020-10-08

## 2020-10-08 ENCOUNTER — TELEPHONE (OUTPATIENT)
Dept: PAIN MEDICINE | Facility: CLINIC | Age: 55
End: 2020-10-08

## 2020-10-08 NOTE — TELEPHONE ENCOUNTER
"Received call from Mr. Abad from call center advocates. He wanted to speak to someone vs sending a message.     Call was transferred to supervisor. He stated " I need some help"     He was asked what type of help as it seems Dr. Cantrell did provide next steps in treatment after his psychological evaluation determined he was not candidate for the DRG implant and he is not a candidate for any interventional procedures at this time.     Mr. Abad was not accepting this information, he would like to know why he is not candidate for the interventional procedure-He specifically wanted to know about the burn he was told about. -it was explained to him that, that wasn't a treatment option offered to him.     He feels that the psychological test was tricky and hard to understand.     He wants Dr. Cantrell to know that he is depressed at this time, he needs some help and he is his last option for help. He reports the [facility] in [facility] is not going to help him, they cut him off his pain medication. He is hurting and needs some help, he is crying out and needs some help. He reports that his foot pain is tormenting him, he is suffering and needs some help.     Mr. Abad was speaking in a derogatory way in regards to the physician that performed his consultation after his testing.     He was informed if he kept up the abuse supervisor would end the call.     Mr. Abad refuse to disconnect call even after supervisor explained all next steps from the provider. Mr. Abad consistently repeated over and over again, he needs help, he is crying out for help and the VA won't help him as they are closed and not accepting patients.     Supervisor informed patient at this time she will present his questions and concerns to provider, however provider is in procedures at the time of the call and he would respond when time allowed.     Mr. Abad was placed on hold, and when returned to the line, he had hung up. he did call " right back, conversation happened over three different calls due to the longevity of the conversation with Mr. Abad and the supervisor having to take on other task as Mr. Abad refuse to disconnect.

## 2020-10-08 NOTE — TELEPHONE ENCOUNTER
Patient was contacted as per patient he stated that he wants to know why he can't have a [facility]FA staff informed patient as per  recommendations he is not a candidate for any interventional procedures. Patient repeated this question 5 times to staff and staff gave him the same response. Patient state that he was in pain. Staff informed him that he can go to the ED. Patient stated that he went and was denied service. Staff informed patient that his previous message was forwarded to  and staff would contact him with a response. Patient disconnected call

## 2020-10-08 NOTE — TELEPHONE ENCOUNTER
----- Message from Saundra Cantrell MD sent at 10/8/2020 11:23 AM CDT -----  He was sent for evaluation for SCS, but is not a candidate at this time based on the report of his psychological evaluation, and needs follow up with psychiatry at the [facility] in [place] or the [facility] in [place] if he is uncomfortable with the one in [place].      But he is not a candidate for interventional procedures at this time and while he keeps asking for opioids, he has an opioid contract with the VA and we are not considering opioid management.    Follow up with our clinic is not necessary.    Saundra Cantrell

## 2020-10-08 NOTE — TELEPHONE ENCOUNTER
----- Message from Saundra Cantrell MD sent at 10/8/2020 11:23 AM CDT -----  He was sent for evaluation for SCS, but is not a candidate at this time based on the report of his psychological evaluation, and needs follow up with psychiatry at the [facility] in [place] or the VA in [place] if he is uncomfortable with the one in [place].      But he is not a candidate for interventional procedures at this time and while he keeps asking for opioids, he has an opioid contract with the VA and we are not considering opioid management.    Follow up with our clinic is not necessary.    Saundra Cantrell

## 2020-10-08 NOTE — TELEPHONE ENCOUNTER
----- Message from Jatinder Hannah sent at 10/8/2020  1:28 PM CDT -----      Name of Who is Calling: ANTWON WILSON [[id number]]      What is the request in detail: Pt called said he's in severe pain and needs to speak with the DrTashia Immediately.Please contact to further discuss and advise.          Can the clinic reply by [facility]: N      What Number to Call Back if not in [facility]: [phone]

## 2020-10-08 NOTE — TELEPHONE ENCOUNTER
Patient was contacted and informed that he is not a candidate for the SCS and interventional procedures at our facility at this time and he should follow up with psychiatry at the VA  in [place] or [place]. Patient stated that both facilities are closed. He is requesting that  calls him regarding this matter.

## 2020-10-08 NOTE — TELEPHONE ENCOUNTER
I attempted to call the [facility] , Madison Drake, at [phone], to discuss this case with her.  Mr. Abad may have significant psychiatric issues that are inadequately managed due to treatment termination of therapy in January and medication in July.  Although he denied SI, he noted he will not endorse anything that will cause him to be hospitalized (he was hospitalized 8 years ago due to SI).  He stated that the [facility] is closed due to COVID, but that does not appear to be the case.  Due to these potential risks, I plan to discuss his case with her to ensure his Mental Health Treatment Coordinator at the [facility] can reach out to him.  Additionally, [facility] at the [facility] as his pain [facility] is there.      -------------------------------------------------------------------------    Addendum -   Ms. Drake returned my call and left a voicemail with a return number ([phone]).  I informed her I do not have consent but do have concern for his safety due to SI risk.  She noted he has indeed cancelled his last two sessions with the NP and is flagged for behavioral issues and opioid-seeking.  She alerted the Suicide Risk Prevention Team of this patient's case and noted they are excellent in managing these issues.  She wrote down my number in case there are any questions.

## 2020-10-08 NOTE — TELEPHONE ENCOUNTER
Mr. Abad left two more voicemail messages asking for help with pain management.  I am discussing his case with leadership in my department and determining the best course of action based on this consultation.

## 2020-10-08 NOTE — TELEPHONE ENCOUNTER
"I called Mr. Abad to follow-up on Friday's evaluation since there was insufficient time to review feedback and recommendations.  Mr. Abad was very upset about the recommendations and was verbally aggressive in his tone and words.  He repeated, "I knew you was going to blackball me" and "Don't  me from the tests" and "Thank you for causing my pain."  I repeated that I report on the data from the interview, medical record, and the test results - and that I do not seek to  but provide an objective report (which is why we use standardized tests).  He was unwilling to consider treatment at the [facility] because "I've told you multiple times the VA is completely shut down, completely closed down due to COVID."  He started to repeat himself, and I told him I would let him speak to pain management.  "

## 2020-10-08 NOTE — TELEPHONE ENCOUNTER
"Mr. Abad left a voicemail asking for a return call.  His voice was at a calmer level and he noted being "dependent on this... the only thing getting in my way is you.  You're messing this up for me... You're the one who's going to mess it up for me, who's going to stop it.  You, Dr. De La Rosa."  He again stated (as he did in the previous calls), "Don't  me by those tests.  They were confusing and tricky."  He asked me not to send "a bad report" to Dr. Cantrell.  He was informed that this was not a good vs bad report, and that the report is a reflection of objective data.  He was informed the recommendations are used to keep patients as safe as possible.  He again stated, "Don't put in no bad report for me."      He was encouraged to contact Patient Advocacy if he would like to discuss his treatment, and I offered to provide him the number.  He noted, "I don't have time for fun and games, I'm in pain."  His voice tone got louder and he asked "WHEN might I hear from Dr. Cantrell about burning the nerves?"  I informed him that he would have to talk to pain management about his options, and that he could call their office.  "

## 2020-10-09 ENCOUNTER — TELEPHONE (OUTPATIENT)
Dept: PSYCHIATRY | Facility: CLINIC | Age: 55
End: 2020-10-09

## 2020-10-09 ENCOUNTER — TELEPHONE (OUTPATIENT)
Dept: PAIN MEDICINE | Facility: CLINIC | Age: 55
End: 2020-10-09

## 2020-10-09 NOTE — TELEPHONE ENCOUNTER
I spoke with Ani from the [facility] [place] [facility]icide Prevention Team.  She asked about our concerns and confirmed that our reports are consistent with his presentation, and he has a history of being perseverative with chronic pain treatment.  She noted he has a diagnosis of Schizoaffective Disorder and he has agreed to do an assessment with them.  They also made a safety plan with him and have made him aware of various resources.

## 2020-10-09 NOTE — TELEPHONE ENCOUNTER
----- Message from Darby Elmore sent at 10/9/2020  1:36 PM CDT -----   Name of Who is Calling:     What is the request in detail: patient request call back and is very upset     NFI   //Please contact to further discuss and advise      Can the clinic reply by [facility]: no     [facility]:  [phone]

## 2020-10-09 NOTE — TELEPHONE ENCOUNTER
Mr. Abad was transferred to supervisor from [place] center. Mr. Abad again, after yesterday's conversation he began the morning conversation with what is considered racist derogatory remarks, supervisor informed him she would not continue the conversation if he continues using the language, as we have been trying to help him to the best of our abilities. He was informed that we have transferred this matter to patient advocacy who will be following up with him as well as reached [facility] Cantrell in order to assist with getting him scheduled to discuss his next treatment option.     Mr. Abad, starting yelling and using profanity, supervisor ended the call.

## 2020-10-09 NOTE — TELEPHONE ENCOUNTER
----- Message from Melinda Burleson sent at 10/9/2020 10:30 AM CDT -----  Contact: ANTWON WILSON [[id number]]  Name of Who is Calling: ANTWON WILSON [[id number]]        What is the request in detail: Would like to speak with staff in regards to needing to talk about something's. No other information provided, please advise.        Can the clinic reply by [facility]: no        [facility]: [phone]

## 2020-11-05 ENCOUNTER — TELEPHONE (OUTPATIENT)
Dept: NEUROSURGERY | Facility: CLINIC | Age: 55
End: 2020-11-05

## 2020-11-05 NOTE — TELEPHONE ENCOUNTER
----- Message from Ryann Mendosa sent at 11/5/2020  1:17 PM CST -----  Contact: pt  Please call pt at [phone]     Patient is calling about some medical concerns (refused to give details)    Patient has been trying to reach the clinic for 2 weeks and very upset that he has not gotten a response     Thank you

## 2020-11-05 NOTE — TELEPHONE ENCOUNTER
"PATIENT HAS BEEN CALLED INFORMED HIM THAT DR. SAWANT WAS OUT OF THE OFFICE TODAY. PATIENT WAS INFORMED THAT THIS WAS THE FIRST MESSAGE I HAVE RECEIVED ON HIM. PATIENT WAS VERY UPSET, AND STATED "[facility] DOES NOT CARE ABOUT THEIR PATIENT'S ONLY THEIR MONEY." I REASSURED PATIENT THAT HIS MESSAGE WILL BE GIVEN TO DR. SAWANT. I EXPRESSED TO PATIENT MY APOLOGIES AND WAS SORRY FOR THE WAY HE FELT. PATIENT HUNG UP PHONE IN FACE BEFORE WE COULD COMPLETE THE CONVERSATION. MESSAGE HAS BEEN FORWARD TO DR. SAWANT AS WELL AS PRINTED AND PLACE ON HER DESK.  "

## 2020-11-06 ENCOUNTER — TELEPHONE (OUTPATIENT)
Dept: PSYCHIATRY | Facility: CLINIC | Age: 55
End: 2020-11-06

## 2020-11-16 ENCOUNTER — TELEPHONE (OUTPATIENT)
Dept: NEUROSURGERY | Facility: CLINIC | Age: 55
End: 2020-11-16

## 2020-11-16 NOTE — TELEPHONE ENCOUNTER
----- Message from Deirdre Potter sent at 11/16/2020  2:44 PM CST -----  Contact: Jovany Pollo  Patient is needing to speak with someone in the office. Patient contact number is [phone].

## 2020-11-17 ENCOUNTER — TELEPHONE (OUTPATIENT)
Dept: NEUROSURGERY | Facility: CLINIC | Age: 55
End: 2020-11-17

## 2020-11-17 NOTE — TELEPHONE ENCOUNTER
Returned called to informed him that he message has been received and escalated to jonathan Hassan.

## 2020-11-17 NOTE — TELEPHONE ENCOUNTER
----- Message from Antonio Agustin sent at 11/17/2020 10:40 AM CST -----  Contact: @ [phone]  Several Attempts:  requesting a return call regarding his health, jin narayanan

## 2020-11-19 ENCOUNTER — TELEPHONE (OUTPATIENT)
Dept: NEUROSURGERY | Facility: CLINIC | Age: 55
End: 2020-11-19

## 2020-11-19 ENCOUNTER — TELEPHONE (OUTPATIENT)
Dept: NEUROLOGY | Facility: CLINIC | Age: 55
End: 2020-11-19

## 2020-11-19 NOTE — TELEPHONE ENCOUNTER
----- Message from Siria Navas sent at 11/19/2020 12:17 PM CST -----  Pt is calling to speak with the nurse and would like for the nurse to give him a call back. Pt is very rude and would like for the nurse to give him a call back now. Please call the pt at [phone]

## 2020-12-18 ENCOUNTER — TELEPHONE (OUTPATIENT)
Dept: PAIN MEDICINE | Facility: CLINIC | Age: 55
End: 2020-12-18

## 2020-12-18 NOTE — TELEPHONE ENCOUNTER
Patient was contacted as per patient he stated that he wanted an appt to be seen in the office. Staff informed patient that he was discharged from the clinic. Patient began yelling and using profanity towards staff and disconnected the call

## 2020-12-18 NOTE — TELEPHONE ENCOUNTER
----- Message from Rusty Benavidez sent at 12/18/2020 11:10 AM CST -----  Name of Who is Calling: ANTWON WILSON  What is the request in detail: The patient is calling to speak to Lyn in regards to some questions the patient have. Please advise  Can the clinic reply by [facility]: No What Number to Call Back if not in [facility]: [phone]

## 2021-04-22 ENCOUNTER — TELEPHONE (OUTPATIENT)
Dept: NEUROSURGERY | Facility: CLINIC | Age: 56
End: 2021-04-22

## 2023-01-18 NOTE — LETTER
October 2, 2020        LIDIA [facility] STAFF             [facility] [address]  [address]  [facility] [address]  Phone: [phone]   Patient: Jovany Abad   MR Number: [id number]   YOB: 1965   Date of Visit: 10/2/2020       Dear  Staff:    Thank you for referring Jovany Abad to me for evaluation. Below are the relevant portions of my assessment and plan of care.    Based on this evaluation, there are contraindications to DRG from a psychological perspective at this time.  The invalidity of his test results prompts caution into his overall reports, and his overt guardedness reinforced issues with his reports.  Additionally, Mr. Abad may be dealing with inadequately treated depression, anxiety, and possible psychotic symptoms.  He reports being prescribed nortriptyline by his PCP, but he notes stopping Geodon and Celexa this past summer.  He also reports stopping psychotherapy and pharmacotherapy prior to the COVID-19 outbreak.  It is recommended that Mr. Abad be treated with a less invasive pain management procedure.  It is recommended [facility]uation for DRG stimulation.    Due to potential risks indicated in this evaluation, I will reach out to the [facility] to have his Mental Health Treatment Coordinator do a welfare check.       If you have questions, please do not hesitate to call me. I look forward to following Jovany along with you.    Sincerely,      Heather De La Rosa, PhD           CC  No Recipients       
1 person assist
1 person assist